# Patient Record
Sex: FEMALE | Race: WHITE | NOT HISPANIC OR LATINO | Employment: FULL TIME | URBAN - METROPOLITAN AREA
[De-identification: names, ages, dates, MRNs, and addresses within clinical notes are randomized per-mention and may not be internally consistent; named-entity substitution may affect disease eponyms.]

---
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
## 2020-02-06 ENCOUNTER — OFFICE VISIT (OUTPATIENT)
Dept: URGENT CARE | Facility: CLINIC | Age: 20
End: 2020-02-06
Payer: COMMERCIAL

## 2020-02-06 VITALS
WEIGHT: 170 LBS | RESPIRATION RATE: 16 BRPM | OXYGEN SATURATION: 95 % | TEMPERATURE: 99 F | BODY MASS INDEX: 29.18 KG/M2 | HEART RATE: 92 BPM | DIASTOLIC BLOOD PRESSURE: 74 MMHG | SYSTOLIC BLOOD PRESSURE: 118 MMHG

## 2020-02-06 DIAGNOSIS — M54.42 CHRONIC BILATERAL LOW BACK PAIN WITH BILATERAL SCIATICA: ICD-10-CM

## 2020-02-06 DIAGNOSIS — M54.41 CHRONIC BILATERAL LOW BACK PAIN WITH BILATERAL SCIATICA: ICD-10-CM

## 2020-02-06 DIAGNOSIS — G89.29 CHRONIC BILATERAL LOW BACK PAIN WITH BILATERAL SCIATICA: ICD-10-CM

## 2020-02-06 PROCEDURE — 99213 OFFICE O/P EST LOW 20 MIN: CPT | Performed by: NURSE PRACTITIONER

## 2020-02-06 ASSESSMENT — ENCOUNTER SYMPTOMS
BACK PAIN: 1
WEAKNESS: 0
FEVER: 0
CONSTITUTIONAL NEGATIVE: 1
NEUROLOGICAL NEGATIVE: 1
GASTROINTESTINAL NEGATIVE: 1
SENSORY CHANGE: 0

## 2020-02-06 NOTE — LETTER
February 6, 2020         Patient: Rosemary Gonsalez   YOB: 2000   Date of Visit: 2/6/2020           To Whom it May Concern:    Rosemary Gonsalez was seen in my clinic on 2/6/2020.  Patient may return to work with the following work restrictions: Lifting, pushing, pulling limited to 10 pounds.  Restrictions in effect until cleared by your sports medicine.    If you have any questions or concerns, please don't hesitate to call.        Sincerely,           JAKE Chand.  Electronically Signed

## 2020-02-07 NOTE — PROGRESS NOTES
Subjective:     Rosemary Gonsalez is a 20 y.o. female who presents for Low Back Pain (pt states she gets lower back pain that comes and goes and it been going on x 3 years)       Back Pain   This is a new problem. The problem has been gradually worsening since onset. Pertinent negatives include no dysuria, fever or weakness.     Patient reports chronic lower back pain x 9 years.  Reports she has seen multiple doctors and has been given different diagnoses, but there is still uncertainty on what causes her pain.  Was told that she has scoliosis.  Has not had an MRI or physical therapy in the past.  Denies previous injury or surgery.  Denies pain at this time.  Reports the pain comes and goes.  Reports that she has been working with weight limits with pushing, pulling, and lifting as these can aggravate the pain.  She just started a new job and needs a new note.  Has not seen specialty care recently.  Just got new insurance.  When patient does get pain, she reports that has a bilateral tingling type pain which would radiate down her thighs.  She would manage the pain with ibuprofen and heat.    PMH:  has a past medical history of Asthma, Bronchitis, and Pneumonia.    MEDS:   Current Outpatient Medications:   •  ibuprofen (MOTRIN) 600 MG Tab, Take 1 Tab by mouth every 6 hours as needed., Disp: 30 Tab, Rfl: 0  •  hydrOXYzine HCl (ATARAX) 25 MG Tab, Take 25 mg by mouth 3 times a day as needed for Itching., Disp: , Rfl:   •  traZODone (DESYREL) 100 MG Tab, Take 25 mg by mouth every evening., Disp: , Rfl:   •  Albuterol Sulfate (VENTOLIN HFA INH), Inhale  by mouth., Disp: , Rfl:   •  ibuprofen (MOTRIN) 600 MG Tab, Take 600 mg by mouth every 6 hours as needed., Disp: , Rfl:   •  Non Formulary Request, asthalin inhaler, Disp: , Rfl:     ALLERGIES:   Allergies   Allergen Reactions   • Other Misc Swelling     Mosquito bites   • Silver Swelling     SURGHX: History reviewed. No pertinent surgical history.    SOCHX:  reports  that she has never smoked. She has never used smokeless tobacco. She reports that she does not drink alcohol or use drugs.     FH: Reviewed with patient, not pertinent to this visit.    Review of Systems   Constitutional: Negative.  Negative for fever and malaise/fatigue.   Gastrointestinal: Negative.    Genitourinary: Negative.  Negative for dysuria.   Musculoskeletal: Positive for back pain.   Neurological: Negative.  Negative for sensory change and weakness.   All other systems reviewed and are negative.    Additional details per HPI.    Objective:     /74 (BP Location: Left arm, Patient Position: Sitting, BP Cuff Size: Adult)   Pulse 92   Temp 37.2 °C (99 °F) (Temporal)   Resp 16   Wt 77.1 kg (170 lb)   SpO2 95%   BMI 29.18 kg/m²     Physical Exam  Vitals signs reviewed.   Constitutional:       General: She is not in acute distress.     Appearance: She is well-developed. She is not toxic-appearing or diaphoretic.   HENT:      Head: Normocephalic.      Right Ear: External ear normal.      Left Ear: External ear normal.      Nose: Nose normal.   Eyes:      Extraocular Movements: Extraocular movements intact.      Conjunctiva/sclera: Conjunctivae normal.   Neck:      Musculoskeletal: Normal range of motion.   Cardiovascular:      Rate and Rhythm: Normal rate.   Pulmonary:      Effort: Pulmonary effort is normal. No respiratory distress.   Musculoskeletal: Normal range of motion.         General: No deformity.      Lumbar back: Normal. She exhibits normal range of motion, no tenderness, no swelling, no deformity, no laceration and no pain.   Skin:     General: Skin is warm and dry.      Coloration: Skin is not pale.   Neurological:      General: No focal deficit present.      Mental Status: She is alert and oriented to person, place, and time.      Sensory: Sensation is intact. No sensory deficit.      Motor: Motor function is intact.      Coordination: Coordination is intact. Coordination normal.       Gait: Gait normal.   Psychiatric:         Behavior: Behavior normal. Behavior is cooperative.          Assessment/Plan:     1. Chronic bilateral low back pain with bilateral sciatica  - REFERRAL TO SPORTS MEDICINE    Work note provided with restrictions.  Would like patient to follow-up with sports medicine for further evaluation and treatment of chronic, recurrent lower back pain.    Advised gentle massage, stretching, and range of motion exercises. May utilize cold/heat application. May use OTC acetaminophen/ibuprofen as needed for additional pain relief.    Vital signs stable, afebrile, asymptomatic, no acute distress.    Warning signs reviewed. Return precautions discussed.    Patient advised to: Return for 1) Symptoms don't improve or worsen, or go to ER, 2) Follow up with primary care in 7-10 days.    Differential diagnosis, natural history, supportive care, and indications for immediate follow-up discussed. All questions answered. Patient agrees with the plan of care.

## 2020-03-23 ENCOUNTER — OFFICE VISIT (OUTPATIENT)
Dept: MEDICAL GROUP | Facility: CLINIC | Age: 20
End: 2020-03-23
Payer: COMMERCIAL

## 2020-03-23 VITALS
BODY MASS INDEX: 29.02 KG/M2 | TEMPERATURE: 98.5 F | OXYGEN SATURATION: 99 % | HEIGHT: 64 IN | SYSTOLIC BLOOD PRESSURE: 122 MMHG | RESPIRATION RATE: 18 BRPM | HEART RATE: 88 BPM | WEIGHT: 170 LBS | DIASTOLIC BLOOD PRESSURE: 80 MMHG

## 2020-03-23 DIAGNOSIS — M53.3 SACROILIAC JOINT DYSFUNCTION OF BOTH SIDES: ICD-10-CM

## 2020-03-23 PROCEDURE — 99203 OFFICE O/P NEW LOW 30 MIN: CPT | Performed by: FAMILY MEDICINE

## 2020-03-23 NOTE — PROGRESS NOTES
CHIEF COMPLAINT:  Rosemary Gonsalez female presenting at the request of SHAYLA Chand  for evaluation of LOW BACK pain.     Rosemary Gonsalez is complaining of mid LUMBAR SPINE pain  present for 9 years  Pain is at the L5  Quality is intermittently sharp and shooting, dull at baseline which is constant  Pain is on right POSTERIOR thigh(s) past knee  Aggravated by Movement lying in same position too long. 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 No pertinent past medical history.    PMH:  has a past medical history of Asthma, Bronchitis, and Pneumonia.  MEDS:   Current Outpatient Medications:   •  ibuprofen (MOTRIN) 600 MG Tab, Take 1 Tab by mouth every 6 hours as needed., Disp: 30 Tab, Rfl: 0  •  hydrOXYzine HCl (ATARAX) 25 MG Tab, Take 25 mg by mouth 3 times a day as needed for Itching., Disp: , Rfl:   •  traZODone (DESYREL) 100 MG Tab, Take 25 mg by mouth every evening., Disp: , Rfl:   •  Albuterol Sulfate (VENTOLIN HFA INH), Inhale  by mouth., Disp: , Rfl:   •  ibuprofen (MOTRIN) 600 MG Tab, Take 600 mg by mouth every 6 hours as needed., Disp: , Rfl:   •  Non Formulary Request, asthalin inhaler, Disp: , Rfl:   ALLERGIES:   Allergies   Allergen Reactions   • Other  "Misc Swelling     Mosquito bites   • Silver Swelling     SURGHX: No past surgical history on file.  SOCHX:  reports that she has never smoked. She has never used smokeless tobacco. She reports that she does not drink alcohol or use drugs.  FH: Family history was reviewed, no pertinent findings to report     PHYSICAL EXAM:  /80 (BP Location: Left arm, Patient Position: Sitting, BP Cuff Size: Adult)   Pulse 88   Temp 36.9 °C (98.5 °F) (Temporal)   Resp 18   Ht 1.626 m (5' 4\")   Wt 77.1 kg (170 lb)   SpO2 99%   BMI 29.18 kg/m²      slightly overweight   mild distress  alert and oriented x 3.  Gait: normal    Lumbar Spine:    Able to walk on heels and toes, Rotates Bilaterally without difficulty, Normal alignement, NO tenderness of the lumbar spine, Difficulty flexing due to pain and Difficulty extending due to pain    Hip Flexion 5/5  Knee Flexion 5/5  Knee Extension 5/5  Foot Dorsiflexion 5/5  EHL testing 5/5      Sensation:  Decreased bilaterally in a nonspecific dermatomal pattern          Reflexes:   Patellar: R 2+/L  2+  Achilles: R 2+/L  2+  Babinski toes down  Upper motor neuron testing is Negative  The legs are otherwise neurovascularly intact     Slump testing is NEGATIVE    Additional Findings: Tight hamstrings     HIP EXAM:    LEFT hip: Range of motion is intact  NEGATIVE pain with internal rotation  denny's test is POSITIVE with POSITIVE tenderness at the sacroiliac joint  NO tenderness of the trochanteric bursa  NO tenderness of the gluteus medius  Freddie's test is NEGATIVE    RIGHT hip: Range of motion is intact  NEGATIVE pain with internal rotation  denny's test is NEGATIVE  NO tenderness of the trochanteric bursa  NO tenderness of the gluteus medius  Freddie's test is NEGATIVE      1. Sacroiliac joint dysfunction of both sides  REFERRAL TO PHYSICAL THERAPY Reason for Therapy: Eval/Treat/Report     Patient responded POSITIVELY to sacroiliac belt    Recommended formal physical therapy    Return in " about 4 weeks (around 4/20/2020).  To see how she is doing with Pilates/home program for sacroiliac joint exercises and see if she started or scheduled to start formal physical therapy.    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  Back pain, fall, injury     TECHNIQUE/ EXAM DESCRIPTION AND NUMBER OF VIEWS:  3 views of the lumbar spine.     COMPARISON: Lumbar spine x-ray 1/24/2019     FINDINGS:  The lumbar vertebral bodies are normal in height.     Alignment is normal.     There is mild levoconvex curvature.     Disk spaces are maintained.     There is no facet arthropathy.     The visualized portions of the abdomen are within normal limits.     IMPRESSION:     Negative for lumbar spine fracture or malalignment          8/29/2019 1:04 AM     HISTORY/REASON FOR EXAM:  Pain Following Trauma     Back pain. Injury     TECHNIQUE/EXAM DESCRIPTION AND NUMBER OF VIEWS:  Thoracic spine series with swimmer's view.     COMPARISON:  Lumbar spine x-ray 8/29/2019     FINDINGS:  The thoracic vertebral bodies are normal in height and alignment.     There are no fractures.     There are no degenerative changes.     The visualized portions of the lungs are clear.     IMPRESSION:        Negative thoracic spine series        done elsewhere and reviewed independently by me    Thank you Adam Kline, MARIA T.P.R.N. for allowing me to participate in caring for your patient.        Disability forms completed and will be scanned.

## 2020-06-30 ENCOUNTER — TELEPHONE (OUTPATIENT)
Dept: MEDICAL GROUP | Facility: MEDICAL CENTER | Age: 20
End: 2020-06-30

## 2020-07-23 ENCOUNTER — TELEPHONE (OUTPATIENT)
Dept: MEDICAL GROUP | Facility: MEDICAL CENTER | Age: 20
End: 2020-07-23

## 2020-10-09 NOTE — ED NOTES
Patient to xray via rney   The patient has been examined and the H&P has been reviewed:    I concur with the findings and no changes have occurred since H&P was written.    Surgery risks, benefits and alternative options discussed and understood by patient/family.          Active Hospital Problems    Diagnosis  POA    Cubital tunnel syndrome [G56.20]  Yes      Resolved Hospital Problems   No resolved problems to display.

## 2021-05-22 ENCOUNTER — OFFICE VISIT (OUTPATIENT)
Dept: URGENT CARE | Facility: CLINIC | Age: 21
End: 2021-05-22
Payer: COMMERCIAL

## 2021-05-22 VITALS
RESPIRATION RATE: 16 BRPM | BODY MASS INDEX: 30.73 KG/M2 | WEIGHT: 180 LBS | HEART RATE: 80 BPM | HEIGHT: 64 IN | DIASTOLIC BLOOD PRESSURE: 70 MMHG | TEMPERATURE: 98.8 F | OXYGEN SATURATION: 97 % | SYSTOLIC BLOOD PRESSURE: 110 MMHG

## 2021-05-22 DIAGNOSIS — Z87.09 HISTORY OF STREP PHARYNGITIS: ICD-10-CM

## 2021-05-22 DIAGNOSIS — J02.9 SORE THROAT: ICD-10-CM

## 2021-05-22 DIAGNOSIS — H66.002 ACUTE SUPPURATIVE OTITIS MEDIA OF LEFT EAR WITHOUT SPONTANEOUS RUPTURE OF TYMPANIC MEMBRANE, RECURRENCE NOT SPECIFIED: ICD-10-CM

## 2021-05-22 DIAGNOSIS — J03.90 ACUTE TONSILLITIS, UNSPECIFIED ETIOLOGY: ICD-10-CM

## 2021-05-22 LAB
INT CON NEG: NEGATIVE
INT CON POS: POSITIVE
S PYO AG THROAT QL: NEGATIVE

## 2021-05-22 PROCEDURE — 87880 STREP A ASSAY W/OPTIC: CPT | Performed by: NURSE PRACTITIONER

## 2021-05-22 PROCEDURE — 99214 OFFICE O/P EST MOD 30 MIN: CPT | Performed by: NURSE PRACTITIONER

## 2021-05-22 RX ORDER — AZITHROMYCIN 250 MG/1
TABLET, FILM COATED ORAL
Qty: 6 TABLET | Refills: 0 | Status: SHIPPED | OUTPATIENT
Start: 2021-05-22 | End: 2021-10-26

## 2021-05-22 RX ORDER — AZITHROMYCIN 500 MG/1
TABLET, FILM COATED ORAL
COMMUNITY
Start: 2021-05-12 | End: 2021-05-22

## 2021-05-22 RX ORDER — MELOXICAM 15 MG/1
15 TABLET ORAL DAILY
COMMUNITY
End: 2021-10-26

## 2021-05-22 RX ORDER — METRONIDAZOLE 500 MG/1
TABLET ORAL
COMMUNITY
Start: 2021-05-12 | End: 2021-05-22

## 2021-05-22 ASSESSMENT — ENCOUNTER SYMPTOMS
EYE DISCHARGE: 0
NAUSEA: 0
COUGH: 0
EYE REDNESS: 0
FEVER: 0
MYALGIAS: 0
SORE THROAT: 1
SINUS PAIN: 0
SHORTNESS OF BREATH: 0

## 2021-05-22 ASSESSMENT — LIFESTYLE VARIABLES: SUBSTANCE_ABUSE: 0

## 2021-05-22 NOTE — PROGRESS NOTES
Rosemary Gonsalez is a 21 y.o. female who presents for Sore Throat (ear pain, white patches in the back of the throat, x2days)      HPI this new problem.  Rosemary is a 21-year-old female presents for sore throat, left ear pain, white patches on her tonsils for 2 days.  Last night she had a subjective fever.  She has a history of frequent strep throat infections.  She has pain with swallowing.  Pain is most significant in her left ear.  Pain radiates from her ear down into her neck.  Last night was hard to sleep because the throbbing pain in her left ear was so intense.  She has taken Tylenol, ibuprofen, throat sprays.  She has had only minimal relief from these interventions.  She has tried to stay well-hydrated.  No other aggravating or alleviating factors.    Review of Systems   Constitutional: Negative for fever and malaise/fatigue.   HENT: Positive for ear pain and sore throat. Negative for congestion and sinus pain.    Eyes: Negative for discharge and redness.   Respiratory: Negative for cough and shortness of breath.    Gastrointestinal: Negative for nausea.   Musculoskeletal: Negative for myalgias.   Endo/Heme/Allergies: Negative for environmental allergies.   Psychiatric/Behavioral: Negative for substance abuse.       Allergies   Allergen Reactions   • Garlic    • Other Misc Swelling     Mosquito bites   • Silver Swelling     Past Medical History:   Diagnosis Date   • Asthma    • Bronchitis    • Pneumonia      No past surgical history on file.  No family history on file.  Social History     Tobacco Use   • Smoking status: Never Smoker   • Smokeless tobacco: Never Used   Substance Use Topics   • Alcohol use: No     There is no problem list on file for this patient.    Current Outpatient Medications on File Prior to Visit   Medication Sig Dispense Refill   • meloxicam (MOBIC) 15 MG tablet Take 15 mg by mouth every day.     • hydrOXYzine HCl (ATARAX) 25 MG Tab Take 25 mg by mouth 3 times a day as needed for  "Itching.     • Albuterol Sulfate (VENTOLIN HFA INH) Inhale  by mouth.     • Non Formulary Request asthalin inhaler       No current facility-administered medications on file prior to visit.          Objective:     /70 (BP Location: Right arm, Patient Position: Sitting, BP Cuff Size: Adult long)   Pulse 80   Temp 37.1 °C (98.8 °F) (Temporal)   Resp 16   Ht 1.626 m (5' 4\")   Wt 81.6 kg (180 lb)   SpO2 97%   BMI 30.90 kg/m²     Physical Exam  Vitals and nursing note reviewed.   Constitutional:       General: She is not in acute distress.     Appearance: She is well-developed. She is not toxic-appearing.   HENT:      Head: Normocephalic.      Right Ear: Hearing, tympanic membrane, ear canal and external ear normal.      Left Ear: Hearing, ear canal and external ear normal. A middle ear effusion is present. Tympanic membrane is erythematous and bulging.      Nose: Nose normal.      Right Sinus: No frontal sinus tenderness.      Left Sinus: No frontal sinus tenderness.      Mouth/Throat:      Lips: Pink.      Mouth: Mucous membranes are moist.      Pharynx: Uvula midline. Posterior oropharyngeal erythema present. No oropharyngeal exudate or uvula swelling.      Tonsils: Tonsillar exudate present. No tonsillar abscesses. 2+ on the right. 2+ on the left.      Comments: Hypertrophic cavernous, erythematous tonsils with white patchy exudate L > R  Eyes:      General: Lids are normal.      Pupils: Pupils are equal, round, and reactive to light.   Neck:      Trachea: Trachea and phonation normal.   Cardiovascular:      Rate and Rhythm: Normal rate and regular rhythm.      Pulses: Normal pulses.   Pulmonary:      Effort: Pulmonary effort is normal. No respiratory distress.      Breath sounds: Normal breath sounds.   Abdominal:      Palpations: Abdomen is soft.   Musculoskeletal:         General: Normal range of motion.      Cervical back: Full passive range of motion without pain, normal range of motion and neck " supple.   Lymphadenopathy:      Head:      Right side of head: No tonsillar adenopathy.      Left side of head: No tonsillar adenopathy.      Cervical: Cervical adenopathy present.      Left cervical: Superficial cervical adenopathy present.      Upper Body:      Right upper body: No supraclavicular adenopathy.      Left upper body: No supraclavicular adenopathy.   Skin:     General: Skin is warm and dry.   Neurological:      Mental Status: She is alert and oriented to person, place, and time.      Deep Tendon Reflexes: Reflexes are normal and symmetric.   Psychiatric:         Speech: Speech normal.         Behavior: Behavior normal.       Results for orders placed or performed in visit on 05/22/21   POCT Rapid Strep A   Result Value Ref Range    Rapid Strep Screen Negative     Internal Control Positive Positive     Internal Control Negative Negative          Assessment /Associated Orders:      1. Sore throat  POCT Rapid Strep A   2. Acute suppurative otitis media of left ear without spontaneous rupture of tympanic membrane, recurrence not specified  azithromycin (ZITHROMAX) 250 MG Tab   3. Acute tonsillitis, unspecified etiology  azithromycin (ZITHROMAX) 250 MG Tab   4. History of strep pharyngitis         Medical Decision Making:    Pt is clinically stable at today's acute urgent care visit.  No acute distress noted. Appropriate for outpatient management at this time.   Acute problem today with uncertain prognosis.     OTC  analgesic of choice (acetaminophen or NSAID). Follow manufactures dosing and safety precautions.   Salt water gargles BID and prn. Suggested 1/4 to 1/2 teaspoon (1.5 to 3.0 g) of salt per one cup (8 ounces or 250 mL) of warm water.   OTC throat analgesic spray or lozenge of choice prn throat pain. Dosage and directions per   Educated in proper administration of medication(s) ordered today including safety, possible SE, risks, benefits, rationale and alternatives to therapy.    Consider probiotics   Discussed possible referral to ENT for frequent strep throat and tonsillitis infections. Declines referral at this time.     Advised to follow-up with the primary care provider for recheck, reevaluation, and consideration of further management if necessary.   Discussed management options (risks,benefits, and alternatives to treatment). Expressed understanding and the treatment plan was agreed upon. Questions were encouraged and answered       Return to urgent care prn if new or worsening sx or if there is no improvement in condition prn . Red flags discussed and indications to immediately call 911 or present to the Emergency Department.     I personally reviewed prior external notes and test results pertinent to today's visit.  I have independently reviewed and interpreted all diagnostics ordered during this urgent care acute visit.   Time spent evaluating this patient was at least 30 minutes and includes preparing for visit, counseling/education, exam and evaluation, obtaining history, independent interpretation, ordering lab/test/procedures,medication management and documentation.

## 2021-05-22 NOTE — LETTER
May 22, 2021       Patient: Rosemary Gonsalez   YOB: 2000   Date of Visit: 5/22/2021         To Whom It May Concern:    In my medical opinion, I recommend that Rosemary Gonsalez return to full duty, no restrictions on 05/23/21              Sincerely,          Doretha Jacob, A.P.N.  Electronically Signed

## 2021-08-05 ENCOUNTER — HOSPITAL ENCOUNTER (OUTPATIENT)
Facility: MEDICAL CENTER | Age: 21
End: 2021-08-05
Attending: NURSE PRACTITIONER
Payer: COMMERCIAL

## 2021-08-05 ENCOUNTER — OFFICE VISIT (OUTPATIENT)
Dept: URGENT CARE | Facility: CLINIC | Age: 21
End: 2021-08-05
Payer: COMMERCIAL

## 2021-08-05 VITALS
OXYGEN SATURATION: 100 % | SYSTOLIC BLOOD PRESSURE: 100 MMHG | WEIGHT: 184.2 LBS | DIASTOLIC BLOOD PRESSURE: 68 MMHG | HEIGHT: 64 IN | RESPIRATION RATE: 16 BRPM | BODY MASS INDEX: 31.45 KG/M2 | TEMPERATURE: 98.3 F | HEART RATE: 107 BPM

## 2021-08-05 DIAGNOSIS — J06.9 VIRAL URI WITH COUGH: ICD-10-CM

## 2021-08-05 DIAGNOSIS — R50.9 FEVER IN ADULT: ICD-10-CM

## 2021-08-05 LAB
INT CON NEG: NORMAL
INT CON POS: NORMAL
S PYO AG THROAT QL: POSITIVE

## 2021-08-05 PROCEDURE — 87880 STREP A ASSAY W/OPTIC: CPT | Performed by: NURSE PRACTITIONER

## 2021-08-05 PROCEDURE — U0003 INFECTIOUS AGENT DETECTION BY NUCLEIC ACID (DNA OR RNA); SEVERE ACUTE RESPIRATORY SYNDROME CORONAVIRUS 2 (SARS-COV-2) (CORONAVIRUS DISEASE [COVID-19]), AMPLIFIED PROBE TECHNIQUE, MAKING USE OF HIGH THROUGHPUT TECHNOLOGIES AS DESCRIBED BY CMS-2020-01-R: HCPCS

## 2021-08-05 PROCEDURE — 99213 OFFICE O/P EST LOW 20 MIN: CPT | Performed by: NURSE PRACTITIONER

## 2021-08-05 PROCEDURE — U0005 INFEC AGEN DETEC AMPLI PROBE: HCPCS

## 2021-08-05 ASSESSMENT — ENCOUNTER SYMPTOMS
FEVER: 1
WHEEZING: 1
DIARRHEA: 0
SHORTNESS OF BREATH: 1
ABDOMINAL PAIN: 0
HEADACHES: 1
VOMITING: 0
SORE THROAT: 1
COUGH: 1

## 2021-08-05 NOTE — LETTER
August 5, 2021         Patient: Rosemary Gonsalez   YOB: 2000   Date of Visit: 8/5/2021     To Whom it May Concern:    Rosemary Gonsalez was seen in my clinic on 8/5/2021.     A concern for COVID-19 has been identified and testing is in progress. The results are available through our electronic delivery system called [employer].      We are asking employers to excuse absences while they follow self-isolation protocol per CDC guidelines:   • At least 10 days since symptoms first appeared and   • At least 24 hours with no fever greater than 100.4 F without fever-reducing medication and   • Symptoms have improved    If results are negative, you can end isolation if symptoms have improved and you have not had a fever in the last 24 hours. You should continue to self isolate per CDC guidelines if you have had direct close contact with someone who's tested positive for COVID-19 (someone you live with is positive, or you were within 6 feet of someone who has COVID-19 for a total of 15 minutes or more). Repeat testing is not offered through our urgent care.     If the results of testing are positive then you will be contacted by your [facility] department for further instructions on duration of self-isolation and return to work. In general, this will also follow the CDC guidelines with a minimum of 10 days from the onset of symptoms and symptoms are improving without fever.      Please schedule a visit with a primary care provider if documents for FMLA, disability, or unemployment are required.      If you have any questions or concerns, please don't hesitate to call.      Sincerely,       JAKE Morales.  Electronically Signed

## 2021-08-06 DIAGNOSIS — R50.9 FEVER IN ADULT: ICD-10-CM

## 2021-08-06 LAB
COVID ORDER STATUS COVID19: NORMAL
SARS-COV-2 RNA RESP QL NAA+PROBE: NOTDETECTED
SPECIMEN SOURCE: NORMAL

## 2021-08-06 NOTE — PROGRESS NOTES
Subjective:     Rosemary Gonsalez is a 21 y.o. female who presents for Fever (congestion, headache x 2 days )      Started on Tuesday. Frequent hx of tonsillitis. Right ear pain, lymph nodes were swollen. Sore throat and ear pain now resolved. SOB,  With hx of asthma. No N/V/D. No hx of COVID. 1st covid vaccine 2 weeks ago, had a headache at the time. No prolonged symptoms.             Fever   This is a new problem. The current episode started in the past 7 days. The problem has been gradually worsening. Associated symptoms include congestion, coughing, ear pain, headaches, a sore throat and wheezing. Pertinent negatives include no abdominal pain, diarrhea or vomiting.   Risk factors: no sick contacts        Past Medical History:   Diagnosis Date   • Asthma    • Bronchitis    • Pneumonia        History reviewed. No pertinent surgical history.    Social History     Socioeconomic History   • Marital status: Single     Spouse name: Not on file   • Number of children: Not on file   • Years of education: Not on file   • Highest education level: Not on file   Occupational History   • Not on file   Tobacco Use   • Smoking status: Never Smoker   • Smokeless tobacco: Never Used   Vaping Use   • Vaping Use: Never used   Substance and Sexual Activity   • Alcohol use: No   • Drug use: No   • Sexual activity: Not on file   Other Topics Concern   • Behavioral problems Not Asked   • Interpersonal relationships Not Asked   • Sad or not enjoying activities Not Asked   • Suicidal thoughts Not Asked   • Poor school performance Not Asked   • Reading difficulties Not Asked   • Speech difficulties Not Asked   • Writing difficulties Not Asked   • Inadequate sleep Not Asked   • Excessive TV viewing Not Asked   • Excessive video game use Not Asked   • Inadequate exercise Not Asked   • Sports related Not Asked   • Poor diet Not Asked   • Family concerns for drug/alcohol abuse Not Asked   • Poor oral hygiene Not Asked   • Bike safety Not  "Asked   • Family concerns vehicle safety Not Asked   Social History Narrative   • Not on file     Social Determinants of Health     Financial Resource Strain:    • Difficulty of Paying Living Expenses:    Food Insecurity:    • Worried About Running Out of Food in the Last Year:    • Ran Out of Food in the Last Year:    Transportation Needs:    • Lack of Transportation (Medical):    • Lack of Transportation (Non-Medical):    Physical Activity:    • Days of Exercise per Week:    • Minutes of Exercise per Session:    Stress:    • Feeling of Stress :    Social Connections:    • Frequency of Communication with Friends and Family:    • Frequency of Social Gatherings with Friends and Family:    • Attends [employer]ast[employer] Services:    • Active Member of Clubs or Organizations:    • Attends Club or Organization Meetings:    • Marital Status:    Intimate Partner Violence:    • Fear of Current or Ex-Partner:    • Emotionally Abused:    • Physically Abused:    • Sexually Abused:         History reviewed. No pertinent family history.     Allergies   Allergen Reactions   • Garlic Cough, Hives, Itching, Shortness of Breath and Swelling   • Other Misc Swelling     Mosquito bites   • Silver Swelling       Review of Systems   Constitutional: Positive for fever and malaise/fatigue.   HENT: Positive for congestion, ear pain and sore throat. Negative for ear discharge.    Respiratory: Positive for cough, shortness of breath and wheezing.    Gastrointestinal: Negative for abdominal pain, diarrhea and vomiting.   Neurological: Positive for headaches.   All other systems reviewed and are negative.       Objective:   /68 (BP Location: Left arm, Patient Position: Sitting, BP Cuff Size: Adult)   Pulse (!) 107   Temp 36.8 °C (98.3 °F) (Temporal)   Resp 16   Ht 1.626 m (5' 4\")   Wt 83.6 kg (184 lb 3.2 oz)   SpO2 100%   BMI 31.62 kg/m²     Physical Exam  Vitals reviewed.   Constitutional:       General: She is not in acute distress.     " Appearance: She is well-developed. She is not toxic-appearing.   HENT:      Head: Normocephalic and atraumatic.      Right Ear: Ear canal and external ear normal. No drainage or tenderness. A middle ear effusion is present. There is no impacted cerumen. No mastoid tenderness. No hemotympanum. Tympanic membrane is not injected, perforated or erythematous.      Left Ear: Tympanic membrane, ear canal and external ear normal. There is no impacted cerumen.      Nose: Mucosal edema present.      Mouth/Throat:      Lips: Pink.      Mouth: Mucous membranes are moist.      Pharynx: Uvula midline. Posterior oropharyngeal erythema present.      Tonsils: No tonsillar exudate or tonsillar abscesses. 2+ on the right. 2+ on the left.      Comments: Mild oropharyngeal erythema.   Eyes:      Conjunctiva/sclera: Conjunctivae normal.   Cardiovascular:      Rate and Rhythm: Normal rate.   Pulmonary:      Effort: Pulmonary effort is normal. No bradypnea, accessory muscle usage, prolonged expiration, respiratory distress or retractions.      Breath sounds: Normal breath sounds.   Musculoskeletal:         General: Normal range of motion.      Cervical back: Normal range of motion and neck supple.   Skin:     General: Skin is warm and dry.      Findings: No rash.   Neurological:      General: No focal deficit present.      Mental Status: She is alert and oriented to person, place, and time.      GCS: GCS eye subscore is 4. GCS verbal subscore is 5. GCS motor subscore is 6.   Psychiatric:         Mood and Affect: Mood normal.         Speech: Speech normal.         Behavior: Behavior normal.         Thought Content: Thought content normal.         Judgment: Judgment normal.         Assessment/Plan:   1. Viral URI with cough  - COVID/SARS CoV-2; Future    2. Fever in adult  - POCT Rapid Strep A  - COVID/SARS CoV-2; Future    Results for orders placed or performed during the hospital encounter of 08/05/21   COVID/SARS CoV-2    Specimen:  Nasopharyngeal; Respirate   Result Value Ref Range    COVID Order Status Received    SARS-CoV-2, PCR (In-House)   Result Value Ref Range    SARS-CoV-2 Source Nasal Swab     SARS-CoV-2 by PCR NotDetected    Symptomatic care.  -Oral hydration and rest.   -Cough control: nonpharmacologic options for cough relief such as throat lozenges, hot tea, honey.  -Over the counter expectorant as directed; Guaifenesin (Mucinex).  -Tylenol or ibuprofen for pain and fever as directed.   -Albuterol as directed.  -OTC antihistamine for ear.     Seek emergency medical care immediately for: Trouble breathing, persistent pain or pressure in the chest, confusion, inability to wake or stay awake, bluish lips or face, persistent tachycardia (fast heart rate), prolonged dizziness, persistent high grade fevers. Follow up for prolonged cough, persistent wheezing, leg swelling, increased or persistent ear pain, or any other concerns. Follow up with PCP.     Discussed viral etiology. COVID S&S, and self isolation guidelines. S&S of PNA with follow up. Discussed testing for strep as initial symptoms started with pharyngitis and lymphadenopathy.     Differential diagnosis, natural history, supportive care, and indications for immediate follow-up discussed.

## 2021-08-06 NOTE — PATIENT INSTRUCTIONS
Symptomatic care.  -Oral hydration and rest.   -Cough control: nonpharmacologic options for cough relief such as throat lozenges, hot tea, honey.  -Over the counter expectorant as directed; Guaifenesin (Mucinex).  -Tylenol or ibuprofen for pain and fever as directed.   -OTC antihistamine for ear.     Seek emergency medical care immediately for: Trouble breathing, persistent pain or pressure in the chest, confusion, inability to wake or stay awake, bluish lips or face, persistent tachycardia (fast heart rate), prolonged dizziness, persistent high grade fevers. Follow up for prolonged cough, persistent wheezing, leg swelling, increased or persistent ear pain, or any other concerns. Follow up with PCP.         Viral Respiratory Infection  A respiratory infection is an illness that affects part of the respiratory system, such as the lungs, nose, or throat. A respiratory infection that is caused by a virus is called a viral respiratory infection.  Common types of viral respiratory infections include:  · A cold.  · The flu (influenza).  · A respiratory syncytial virus (RSV) infection.  What are the causes?  This condition is caused by a virus.  What are the signs or symptoms?  Symptoms of this condition include:  · A stuffy or runny nose.  · Yellow or green nasal discharge.  · A cough.  · Sneezing.  · Fatigue.  · Achy muscles.  · A sore throat.  · Sweating or chills.  · A fever.  · A headache.  How is this diagnosed?  This condition may be diagnosed based on:  · Your symptoms.  · A physical exam.  · Testing of nasal swabs.  How is this treated?  This condition may be treated with medicines, such as:  · Antiviral medicine. This may shorten the length of time a person has symptoms.  · Expectorants. These make it easier to cough up mucus.  · Decongestant nasal sprays.  · Acetaminophen or NSAIDs to relieve fever and pain.  Antibiotic medicines are not prescribed for viral infections. This is because antibiotics are designed  to kill bacteria. They are not effective against viruses.  Follow these instructions at home:    Managing pain and congestion  · Take over-the-counter and prescription medicines only as told by your health care provider.  · If you have a sore throat, gargle with a salt-water mixture 3-4 times a day or as needed. To make a salt-water mixture, completely dissolve ½-1 tsp of salt in 1 cup of warm water.  · Use nose drops made from salt water to ease congestion and soften raw skin around your nose.  · Drink enough fluid to keep your urine pale yellow. This helps prevent dehydration and helps loosen up mucus.  General instructions  · Rest as much as possible.  · Do not drink alcohol.  · Do not use any products that contain nicotine or tobacco, such as cigarettes and e-cigarettes. If you need help quitting, ask your health care provider.  · Keep all follow-up visits as told by your health care provider. This is important.  How is this prevented?    · Get an annual flu shot. You may get the flu shot in late summer, fall, or winter. Ask your health care provider when you should get your flu shot.  · Avoid exposing others to your respiratory infection.  ? Stay home from work or school as told by your health care provider.  ? Wash your hands with soap and water often, especially after you cough or sneeze. If soap and water are not available, use alcohol-based hand .  · Avoid contact with people who are sick during cold and flu season. This is generally fall and winter.  Contact a health care provider if:  · Your symptoms last for 10 days or longer.  · Your symptoms get worse over time.  · You have a fever.  · You have severe sinus pain in your face or forehead.  · The glands in your jaw or neck become very swollen.  Get help right away if you:  · Feel pain or pressure in your chest.  · Have shortness of breath.  · Faint or feel like you will faint.  · Have severe and persistent vomiting.  · Feel confused or  disoriented.  Summary  · A respiratory infection is an illness that affects part of the respiratory system, such as the lungs, nose, or throat. A respiratory infection that is caused by a virus is called a viral respiratory infection.  · Common types of viral respiratory infections are a cold, influenza, and respiratory syncytial virus (RSV) infection.  · Symptoms of this condition include a stuffy or runny nose, cough, sneezing, fatigue, achy muscles, sore throat, and fevers or chills.  · Antibiotic medicines are not prescribed for viral infections. This is because antibiotics are designed to kill bacteria. They are not effective against viruses.  This information is not intended to replace advice given to you by your health care provider. Make sure you discuss any questions you have with your health care provider.  Document Released: 09/27/2006 Document Revised: 12/26/2019 Document Reviewed: 01/28/2019  [employer] Patient Education © 2020 [employer]      INSTRUCTIONS FOR COVID-19 OR ANY OTHER INFECTIOUS RESPIRATORY ILLNESSES    The Centers for Disease Control and Prevention (CDC) states that early indications for COVID-19 include cough, shortness of breath, difficulty breathing, or at least two of the following symptoms: chills, shaking with chills, muscle pain, headache, sore throat, and loss of taste or smell. Symptoms can range from mild to severe and may appear up to two weeks after exposure to the virus.    The practice of self-isolation and quarantine helps protect the public and your family by  preventing exposure to people who have or may have a contagious disease. Please follow the prevention steps below as based on CDC guidelines:    WHEN TO STOP ISOLATION: Persons with COVID-19 or any other infectious respiratory illness who have symptoms and were advised to care for themselves at home may discontinue home isolation under the following conditions:  · At least 24 hours have passed since recovery defined  as resolution of fever without the use of fever-reducing medications; AND,  · Improvement in respiratory symptoms (e.g., cough, shortness of breath); AND,  · At least 10 days have passed since symptoms first appeared and have had no subsequent illness.    MONITOR YOUR SYMPTOMS: If your illness is worsening, seek prompt medical attention. If you have a medical emergency and need to call 911, notify the dispatch personnel that you have, or are being evaluated for confirmed or suspected COVID-19 or another infectious respiratory illness. Wear a facemask if possible.    ACTIVITY RESTRICTION: restrict activities outside your home, except for getting medical care. Do not go to work, school, or public areas. Avoid using public transportation, ride-sharing, or taxis.    SCHEDULED MEDICAL APPOINTMENTS: Notify your provider that you have, or are being evaluated for, confirmed or suspected COVID-19 or another infectious respiratory. This will help the healthcare provider’s office safely take care of you and keep other people from getting exposed or infected.    FACEMASKS, when to wear: Anytime you are away from your home or around other people or pets. If you are unable to wear one, maintain a minimum of 6 feet distancing from others.    LIVING ENVIRONMENT: Stay in a separate room from other people and pets. If possible, use a separate bathroom, have someone else care for your pets and avoid sharing household items. Any items used should be washed thoroughly with soap and water. Clean all “high-touch” surfaces every day. Use a household cleaning spray or wipe, according to the label instructions. High touch surfaces include (but are not limited to) counters, tabletops, doorknobs, bathroom fixtures, toilets, phones, keyboards, tablets, and bedside tables.     HAND WASHING: Frequently wash hands with soap and water for at least 20 seconds,  especially after blowing your nose, coughing, or sneezing; going to the bathroom; before  and after interacting with pets; and before and after eating or preparing food. If hands are visibly dirty use soap and water. If soap and water are not available, use an alcohol-based hand  with at least 60% alcohol. Avoid touching your eyes, nose, and mouth with unwashed hands. Cover your coughs and sneezes with a tissue. Throw used tissues in a lined trash can. Immediately wash your hands.    ACTIVE/FACILITATED SELF-MONITORING: Follow instructions provided by your local health department or health professionals, as appropriate. When working with your local health department check their available hours.    [facility]   Phone Number   [place] [phone]   [facility] [address] [place] [phone]   [place] [address]   [place] [phone]     IF YOU HAVE CONFIRMED POSITIVE COVID-19:    Those who have completely recovered from COVID-19 may have immune-boosting antibodies in their plasma--called “convalescent plasma”--that could be used to treat critically ill COVID19 patients.    [facility] is excited to begin working with [facility] on collecting convalescent plasma from  people who have recovered from COVID-19 as part of a program to treat patients infected with the virus. This FDA-approved “emergency investigational new drug” is a special blood product containing antibodies that may give patients an extra boost to fight the virus.    To be eligible to donate convalescent plasma, you must have a prior COVID-19 diagnosis documented by a laboratory test (or a positive test result for SARS-CoV-2 antibodies) and meet additional eligibility requirements.    If you are interested in donating convalescent plasma or have any additional questions, please contact the [facility] Convalescent Plasma  at [phone] or via e-mail at [email or web address].    COVID-19  COVID-19 is a respiratory infection that is caused by a virus called severe acute respiratory syndrome coronavirus 2  (SARS-CoV-2). The disease is also known as coronavirus disease or novel coronavirus. In some people, the virus may not cause any symptoms. In others, it may cause a serious infection. The infection can get worse quickly and can lead to complications, such as:  · Pneumonia, or infection of the lungs.  · Acute respiratory distress syndrome or ARDS. This is fluid build-up in the lungs.  · Acute respiratory failure. This is a condition in which there is not enough oxygen passing from the lungs to the body.  · Sepsis or septic shock. This is a serious bodily reaction to an infection.  · Blood clotting problems.  · Secondary infections due to bacteria or fungus.  The virus that causes COVID-19 is contagious. This means that it can spread from person to person through droplets from coughs and sneezes (respiratory secretions).  What are the causes?  This illness is caused by a virus. You may catch the virus by:  · Breathing in droplets from an infected person's cough or sneeze.  · Touching something, like a table or a doorknob, that was exposed to the virus (contaminated) and then touching your mouth, nose, or eyes.  What increases the risk?  Risk for infection  You are more likely to be infected with this virus if you:  · Live in or travel to an area with a COVID-19 outbreak.  · Come in contact with a sick person who recently traveled to an area with a COVID-19 outbreak.  · Provide care for or live with a person who is infected with COVID-19.  Risk for serious illness  You are more likely to become seriously ill from the virus if you:  · Are 65 years of age or older.  · Have a long-term disease that lowers your body's ability to fight infection (immunocompromised).  · Live in a nursing home or long-term care facility.  · Have a long-term (chronic) disease such as:  ? Chronic lung disease, including chronic obstructive pulmonary disease or asthma  ? Heart disease.  ? Diabetes.  ? Chronic kidney disease.  ? Liver  disease.  · Are obese.  What are the signs or symptoms?  Symptoms of this condition can range from mild to severe. Symptoms may appear any time from 2 to 14 days after being exposed to the virus. They include:  · A fever.  · A cough.  · Difficulty breathing.  · Chills.  · Muscle pains.  · A sore throat.  · Loss of taste or smell.  Some people may also have stomach problems, such as nausea, vomiting, or diarrhea.  Other people may not have any symptoms of COVID-19.  How is this diagnosed?  This condition may be diagnosed based on:  · Your signs and symptoms, especially if:  ? You live in an area with a COVID-19 outbreak.  ? You recently traveled to or from an area where the virus is common.  ? You provide care for or live with a person who was diagnosed with COVID-19.  · A physical exam.  · Lab tests, which may include:  ? A nasal swab to take a sample of fluid from your nose.  ? A throat swab to take a sample of fluid from your throat.  ? A sample of mucus from your lungs (sputum).  ? Blood tests.  · Imaging tests, which may include, X-rays, CT scan, or ultrasound.  How is this treated?  At present, there is no medicine to treat COVID-19. Medicines that treat other diseases are being used on a trial basis to see if they are effective against COVID-19.  Your health care provider will talk with you about ways to treat your symptoms. For most people, the infection is mild and can be managed at home with rest, fluids, and over-the-counter medicines.  Treatment for a serious infection usually takes places in a hospital intensive care unit (ICU). It may include one or more of the following treatments. These treatments are given until your symptoms improve.  · Receiving fluids and medicines through an IV.  · Supplemental oxygen. Extra oxygen is given through a tube in the nose, a face mask, or a carter.  · Positioning you to lie on your stomach (prone position). This makes it easier for oxygen to get into the  lungs.  · Continuous positive airway pressure (CPAP) or bi-level positive airway pressure (BPAP) machine. This treatment uses mild air pressure to keep the airways open. A tube that is connected to a motor delivers oxygen to the body.  · Ventilator. This treatment moves air into and out of the lungs by using a tube that is placed in your windpipe.  · Tracheostomy. This is a procedure to create a hole in the neck so that a breathing tube can be inserted.  · Extracorporeal membrane oxygenation (ECMO). This procedure gives the lungs a chance to recover by taking over the functions of the heart and lungs. It supplies oxygen to the body and removes carbon dioxide.  Follow these instructions at home:  Lifestyle  · If you are sick, stay home except to get medical care. Your health care provider will tell you how long to stay home. Call your health care provider before you go for medical care.  · Rest at home as told by your health care provider.  · Do not use any products that contain nicotine or tobacco, such as cigarettes, e-cigarettes, and chewing tobacco. If you need help quitting, ask your health care provider.  · Return to your normal activities as told by your health care provider. Ask your health care provider what activities are safe for you.  General instructions  · Take over-the-counter and prescription medicines only as told by your health care provider.  · Drink enough fluid to keep your urine pale yellow.  · Keep all follow-up visits as told by your health care provider. This is important.  How is this prevented?    There is no vaccine to help prevent COVID-19 infection. However, there are steps you can take to protect yourself and others from this virus.  To protect yourself:   · Do not travel to areas where COVID-19 is a risk. The areas where COVID-19 is reported change often. To identify high-risk areas and travel restrictions, check the CDC travel website: wwwnc.cdc.gov/travel/notices  · If you live in,  or must travel to, an area where COVID-19 is a risk, take precautions to avoid infection.  ? Stay away from people who are sick.  ? Wash your hands often with soap and water for 20 seconds. If soap and water are not available, use an alcohol-based hand .  ? Avoid touching your mouth, face, eyes, or nose.  ? Avoid going out in public, follow guidance from your state and local health authorities.  ? If you must go out in public, wear a cloth face covering or face mask.  ? Disinfect objects and surfaces that are frequently touched every day. This may include:  § Counters and tables.  § Doorknobs and light switches.  § Sinks and faucets.  § Electronics, such as phones, remote controls, keyboards, computers, and tablets.  To protect others:  If you have symptoms of COVID-19, take steps to prevent the virus from spreading to others.  · If you think you have a COVID-19 infection, contact your health care provider right away. Tell your health care team that you think you may have a COVID-19 infection.  · Stay home. Leave your house only to seek medical care. Do not use public transport.  · Do not travel while you are sick.  · Wash your hands often with soap and water for 20 seconds. If soap and water are not available, use alcohol-based hand .  · Stay away from other members of your household. Let healthy household members care for children and pets, if possible. If you have to care for children or pets, wash your hands often and wear a mask. If possible, stay in your own room, separate from others. Use a different bathroom.  · Make sure that all people in your household wash their hands well and often.  · Cough or sneeze into a tissue or your sleeve or elbow. Do not cough or sneeze into your hand or into the air.  · Wear a cloth face covering or face mask.  Where to find more information  · Centers for Disease Control and Prevention: www.cdc.gov/coronavirus/2019-ncov/index.html  · World Health  Organization: www.who.int/health-topics/coronavirus  Contact a health care provider if:  · You live in or have traveled to an area where COVID-19 is a risk and you have symptoms of the infection.  · You have had contact with someone who has COVID-19 and you have symptoms of the infection.  Get help right away if:  · You have trouble breathing.  · You have pain or pressure in your chest.  · You have confusion.  · You have bluish lips and fingernails.  · You have difficulty waking from sleep.  · You have symptoms that get worse.  These symptoms may represent a serious problem that is an emergency. Do not wait to see if the symptoms will go away. Get medical help right away. Call your local emergency services (911 in the [place] Do not drive yourself to the hospital. Let the emergency medical personnel know if you think you have COVID-19.  Summary  · COVID-19 is a respiratory infection that is caused by a virus. It is also known as coronavirus disease or novel coronavirus. It can cause serious infections, such as pneumonia, acute respiratory distress syndrome, acute respiratory failure, or sepsis.  · The virus that causes COVID-19 is contagious. This means that it can spread from person to person through droplets from coughs and sneezes.  · You are more likely to develop a serious illness if you are 65 years of age or older, have a weak immunity, live in a nursing home, or have chronic disease.  · There is no medicine to treat COVID-19. Your health care provider will talk with you about ways to treat your symptoms.  · Take steps to protect yourself and others from infection. Wash your hands often and disinfect objects and surfaces that are frequently touched every day. Stay away from people who are sick and wear a mask if you are sick.  This information is not intended to replace advice given to you by your health care provider. Make sure you discuss any questions you have with your health care provider.  Document  Released: 01/23/2020 Document Revised: 05/14/2020 Document Reviewed: 01/23/2020  [employer] Patient Education © 2020 [employer]

## 2021-08-06 NOTE — PROGRESS NOTES
Subjective:     Rosemary Gonsalez is a 21 y.o. female who presents for Fever (congestion, headache x 2 days )      Started on Tuesday. Frequent hx tonsillitis. Right ear pain, lymph nodes were swollen. ST and ear pain resolved. Cough Tuesday. SOB, asthma. No N/V/D. No hx COVID. 1st covid vaccine 2 weeks ago, HA>     Fever   This is a new problem. Associated symptoms include congestion, coughing, ear pain, headaches, muscle aches, a sore throat and wheezing. Pertinent negatives include no abdominal pain.   Risk factors: no sick contacts        Past Medical History:   Diagnosis Date   • Asthma    • Bronchitis    • Pneumonia        History reviewed. No pertinent surgical history.    Social History     Socioeconomic History   • Marital status: Single     Spouse name: Not on file   • Number of children: Not on file   • Years of education: Not on file   • Highest education level: Not on file   Occupational History   • Not on file   Tobacco Use   • Smoking status: Never Smoker   • Smokeless tobacco: Never Used   Vaping Use   • Vaping Use: Never used   Substance and Sexual Activity   • Alcohol use: No   • Drug use: No   • Sexual activity: Not on file   Other Topics Concern   • Behavioral problems Not Asked   • Interpersonal relationships Not Asked   • Sad or not enjoying activities Not Asked   • Suicidal thoughts Not Asked   • Poor school performance Not Asked   • Reading difficulties Not Asked   • Speech difficulties Not Asked   • Writing difficulties Not Asked   • Inadequate sleep Not Asked   • Excessive TV viewing Not Asked   • Excessive video game use Not Asked   • Inadequate exercise Not Asked   • Sports related Not Asked   • Poor diet Not Asked   • Family concerns for drug/alcohol abuse Not Asked   • Poor oral hygiene Not Asked   • Bike safety Not Asked   • Family concerns vehicle safety Not Asked   Social History Narrative   • Not on file     Social Determinants of Health     Financial Resource Strain:    •  "Difficulty of Paying Living Expenses:    Food Insecurity:    • Worried About Running Out of Food in the Last Year:    • Ran Out of Food in the Last Year:    Transportation Needs:    • Lack of Transportation (Medical):    • Lack of Transportation (Non-Medical):    Physical Activity:    • Days of Exercise per Week:    • Minutes of Exercise per Session:    Stress:    • Feeling of Stress :    Social Connections:    • Frequency of Communication with Friends and Family:    • Frequency of Social Gatherings with Friends and Family:    • Attends Mosque Services:    • Active Member of Clubs or Organizations:    • Attends Club or Organization Meetings:    • Marital Status:    Intimate Partner Violence:    • Fear of Current or Ex-Partner:    • Emotionally Abused:    • Physically Abused:    • Sexually Abused:         No family history on file.     Allergies   Allergen Reactions   • Garlic Cough, Hives, Itching, Shortness of Breath and Swelling   • Other Misc Swelling     Mosquito bites   • Silver Swelling       Review of Systems   Constitutional: Positive for fever and malaise/fatigue.   HENT: Positive for congestion, ear pain and sore throat.    Respiratory: Positive for cough and wheezing.    Gastrointestinal: Negative for abdominal pain.   Neurological: Positive for headaches.   All other systems reviewed and are negative.       Objective:   /68 (BP Location: Left arm, Patient Position: Sitting, BP Cuff Size: Adult)   Pulse (!) 107   Temp 36.8 °C (98.3 °F) (Temporal)   Resp 16   Ht 1.626 m (5' 4\")   Wt 83.6 kg (184 lb 3.2 oz)   SpO2 100%   BMI 31.62 kg/m²     Physical Exam    Assessment/Plan:   There are no diagnoses linked to this encounter.    Differential diagnosis, natural history, supportive care, and indications for immediate follow-up discussed.  "

## 2021-08-12 ASSESSMENT — ENCOUNTER SYMPTOMS
ABDOMINAL PAIN: 0
HEADACHES: 1

## 2021-10-26 ENCOUNTER — OFFICE VISIT (OUTPATIENT)
Dept: URGENT CARE | Facility: CLINIC | Age: 21
End: 2021-10-26
Payer: COMMERCIAL

## 2021-10-26 VITALS
OXYGEN SATURATION: 97 % | DIASTOLIC BLOOD PRESSURE: 84 MMHG | BODY MASS INDEX: 30.73 KG/M2 | HEIGHT: 64 IN | RESPIRATION RATE: 18 BRPM | WEIGHT: 180 LBS | TEMPERATURE: 97.8 F | SYSTOLIC BLOOD PRESSURE: 120 MMHG | HEART RATE: 96 BPM

## 2021-10-26 DIAGNOSIS — J06.9 VIRAL URI WITH COUGH: ICD-10-CM

## 2021-10-26 DIAGNOSIS — U07.1 COVID-19 VIRUS INFECTION: ICD-10-CM

## 2021-10-26 DIAGNOSIS — J02.9 PHARYNGITIS, UNSPECIFIED ETIOLOGY: ICD-10-CM

## 2021-10-26 LAB
EXTERNAL QUALITY CONTROL: NORMAL
INT CON NEG: NEGATIVE
INT CON POS: POSITIVE
S PYO AG THROAT QL: NEGATIVE
SARS-COV+SARS-COV-2 AG RESP QL IA.RAPID: POSITIVE

## 2021-10-26 PROCEDURE — 99213 OFFICE O/P EST LOW 20 MIN: CPT | Mod: CS | Performed by: PHYSICIAN ASSISTANT

## 2021-10-26 PROCEDURE — 87880 STREP A ASSAY W/OPTIC: CPT | Performed by: PHYSICIAN ASSISTANT

## 2021-10-26 PROCEDURE — 87426 SARSCOV CORONAVIRUS AG IA: CPT | Performed by: PHYSICIAN ASSISTANT

## 2021-10-26 NOTE — LETTER
October 26, 2021         Patient: Rosemary Gonsalez   YOB: 2000   Date of Visit: 10/26/2021           To Whom it May Concern:    Rosemary Gonsalez was seen in my clinic on 10/26/2021. She tested POSITIVE for COVID-19 virus in the urgent care today. She may return to work on 11/2/2021 as long as her symptoms are starting to improve and she is no longer having fever, vomiting, or diarrhea.    If you have any questions or concerns, please don't hesitate to call.        Sincerely,           Cecelia Clarke P.A.-C.  Electronically Signed

## 2021-10-31 RX ORDER — NORGESTIMATE AND ETHINYL ESTRADIOL 0.25-0.035
KIT ORAL
COMMUNITY
Start: 2021-10-12 | End: 2022-06-15

## 2021-10-31 ASSESSMENT — ENCOUNTER SYMPTOMS
PALPITATIONS: 0
VOMITING: 0
SORE THROAT: 1
EYE PAIN: 0
SINUS PAIN: 0
COUGH: 1
NAUSEA: 0
CHILLS: 1
HEADACHES: 1
FEVER: 1
SHORTNESS OF BREATH: 0
DIARRHEA: 0
DIZZINESS: 0
BLURRED VISION: 0
MYALGIAS: 1
ABDOMINAL PAIN: 0

## 2021-11-01 NOTE — PROGRESS NOTES
"Subjective     Valentina Gonsalez is a 21 y.o. female who presents with Pharyngitis (x1 week,  cough, body aches, fever, loss of taste)    HPI:  Rosemary Gonsalez is a 21 y.o. female who presents today for evaluation of sore throat and URI symptoms.  Patient reports that the past week she has had sore throat, cough, body aches, fever, and fatigue.  She also notes some increased nasal congestion/runny nose.  Approximately 30 minutes ago she also lost her sense of taste.      Review of Systems   Constitutional: Positive for chills, fever and malaise/fatigue.   HENT: Positive for congestion and sore throat. Negative for ear pain and sinus pain.    Eyes: Negative for blurred vision and pain.   Respiratory: Positive for cough. Negative for shortness of breath.    Cardiovascular: Negative for chest pain and palpitations.   Gastrointestinal: Negative for abdominal pain, diarrhea, nausea and vomiting.   Musculoskeletal: Positive for myalgias.   Skin: Negative for rash.   Neurological: Positive for headaches. Negative for dizziness.         PMH:  has a past medical history of Asthma, Bronchitis, and Pneumonia.  MEDS:   Current Outpatient Medications:   •  hydrOXYzine HCl (ATARAX) 25 MG Tab, Take 25 mg by mouth 3 times a day as needed for Itching., Disp: , Rfl:   •  Albuterol Sulfate (VENTOLIN HFA INH), Inhale  by mouth., Disp: , Rfl:   ALLERGIES:   Allergies   Allergen Reactions   • Garlic Cough, Hives, Itching, Shortness of Breath and Swelling   • Other Misc Swelling     Mosquito bites   • Silver Swelling     SURGHX: History reviewed. No pertinent surgical history.  SOCHX:  reports that she has never smoked. She has never used smokeless tobacco. She reports that she does not drink alcohol and does not use drugs.  FH: Family history was reviewed, no pertinent findings to report        Objective     /84   Pulse 96   Temp 36.6 °C (97.8 °F) (Temporal)   Resp 18   Ht 1.626 m (5' 4\")   Wt 81.6 kg (180 lb)   SpO2 " 97%   BMI 30.90 kg/m²      Physical Exam  Constitutional:       Appearance: She is well-developed.   HENT:      Head: Normocephalic and atraumatic.      Right Ear: Tympanic membrane, ear canal and external ear normal.      Left Ear: Tympanic membrane, ear canal and external ear normal.      Nose: Mucosal edema, congestion and rhinorrhea present. Rhinorrhea is clear.      Mouth/Throat:      Lips: Pink.      Mouth: Mucous membranes are moist.      Pharynx: Oropharynx is clear. Uvula midline. No uvula swelling.   Eyes:      Conjunctiva/sclera: Conjunctivae normal.      Pupils: Pupils are equal, round, and reactive to light.   Cardiovascular:      Rate and Rhythm: Normal rate and regular rhythm.      Heart sounds: Normal heart sounds. No murmur heard.     Pulmonary:      Effort: Pulmonary effort is normal.      Breath sounds: Normal breath sounds. No wheezing.   Musculoskeletal:      Cervical back: Normal range of motion.   Lymphadenopathy:      Cervical: No cervical adenopathy.   Skin:     General: Skin is warm and dry.      Capillary Refill: Capillary refill takes less than 2 seconds.   Neurological:      Mental Status: She is alert and oriented to person, place, and time.   Psychiatric:         Behavior: Behavior normal.         Judgment: Judgment normal.              POCT Rapid Strep A - Negative        POCT SARS-COV Antigen [facility] Manual Result - POSITIVE  Assessment & Plan     1. Pharyngitis, unspecified etiology  - POCT Rapid Strep A  - POCT SARS-COV Antigen [facility] Manual Result    2. Viral URI with cough  - POCT SARS-COV Antigen [facility] Manual Result    3. COVID-19 virus infection  - OTC cold/flu medications  - PO fluids  - Rest  - Tylenol or ibuprofen as needed for fever > 100.4 F  Patient was advised that she will need to self isolate for a total of 10 days, day 1 being the first day that her symptoms started.            Differential Diagnosis, natural history, and supportive care discussed. Return to the Urgent Care  or follow up with your PCP if symptoms fail to resolve, or for any new or worsening symptoms. Emergency room precautions discussed. Patient and/or family appears understanding of information.

## 2021-11-12 ENCOUNTER — OFFICE VISIT (OUTPATIENT)
Dept: URGENT CARE | Facility: CLINIC | Age: 21
End: 2021-11-12
Payer: COMMERCIAL

## 2021-11-12 VITALS
OXYGEN SATURATION: 98 % | WEIGHT: 181 LBS | BODY MASS INDEX: 30.9 KG/M2 | TEMPERATURE: 97.9 F | HEIGHT: 64 IN | HEART RATE: 100 BPM | SYSTOLIC BLOOD PRESSURE: 100 MMHG | DIASTOLIC BLOOD PRESSURE: 60 MMHG | RESPIRATION RATE: 20 BRPM

## 2021-11-12 DIAGNOSIS — R39.9 UTI SYMPTOMS: ICD-10-CM

## 2021-11-12 DIAGNOSIS — Z87.440 HISTORY OF UTI: ICD-10-CM

## 2021-11-12 DIAGNOSIS — N39.0 URINARY TRACT INFECTION WITHOUT HEMATURIA, SITE UNSPECIFIED: ICD-10-CM

## 2021-11-12 PROBLEM — D23.9 DERMATOFIBROMA: Status: ACTIVE | Noted: 2021-11-12

## 2021-11-12 PROBLEM — F41.9 ANXIETY: Status: ACTIVE | Noted: 2021-11-12

## 2021-11-12 PROBLEM — J30.2 SEASONAL ALLERGIES: Status: ACTIVE | Noted: 2021-11-12

## 2021-11-12 PROBLEM — E66.3 OVERWEIGHT: Status: ACTIVE | Noted: 2021-11-12

## 2021-11-12 PROBLEM — F32.9 MAJOR DEPRESSION, SINGLE EPISODE: Status: ACTIVE | Noted: 2021-11-12

## 2021-11-12 PROBLEM — J45.909 ASTHMA WITHOUT STATUS ASTHMATICUS: Status: ACTIVE | Noted: 2018-07-17

## 2021-11-12 PROBLEM — M54.50 LOW BACK PAIN: Status: ACTIVE | Noted: 2018-07-17

## 2021-11-12 LAB
APPEARANCE UR: CLEAR
BILIRUB UR STRIP-MCNC: NEGATIVE MG/DL
COLOR UR AUTO: YELLOW
GLUCOSE UR STRIP.AUTO-MCNC: NEGATIVE MG/DL
INT CON NEG: NEGATIVE
INT CON POS: POSITIVE
KETONES UR STRIP.AUTO-MCNC: NEGATIVE MG/DL
LEUKOCYTE ESTERASE UR QL STRIP.AUTO: NORMAL
NITRITE UR QL STRIP.AUTO: NEGATIVE
PH UR STRIP.AUTO: 7 [PH] (ref 5–8)
POC URINE PREGNANCY TEST: NEGATIVE
PROT UR QL STRIP: NEGATIVE MG/DL
RBC UR QL AUTO: NEGATIVE
SP GR UR STRIP.AUTO: 1.02
UROBILINOGEN UR STRIP-MCNC: NORMAL MG/DL

## 2021-11-12 PROCEDURE — 81002 URINALYSIS NONAUTO W/O SCOPE: CPT | Performed by: NURSE PRACTITIONER

## 2021-11-12 PROCEDURE — 99214 OFFICE O/P EST MOD 30 MIN: CPT | Performed by: NURSE PRACTITIONER

## 2021-11-12 PROCEDURE — 81025 URINE PREGNANCY TEST: CPT | Performed by: NURSE PRACTITIONER

## 2021-11-12 RX ORDER — CEFDINIR 300 MG/1
300 CAPSULE ORAL 2 TIMES DAILY
Qty: 14 CAPSULE | Refills: 0 | Status: SHIPPED | OUTPATIENT
Start: 2021-11-12 | End: 2021-11-19

## 2021-11-12 RX ORDER — LORATADINE 10 MG/1
TABLET ORAL
COMMUNITY

## 2021-11-12 RX ORDER — MELOXICAM 15 MG/1
TABLET ORAL
COMMUNITY
End: 2022-06-15

## 2021-11-12 RX ORDER — TRAZODONE HYDROCHLORIDE 50 MG/1
TABLET ORAL
COMMUNITY

## 2021-11-12 ASSESSMENT — ENCOUNTER SYMPTOMS
FLANK PAIN: 1
FEVER: 0
CONSTITUTIONAL NEGATIVE: 1
BACK PAIN: 1

## 2021-11-12 ASSESSMENT — VISUAL ACUITY: OU: 1

## 2021-11-12 NOTE — LETTER
November 12, 2021         Patient: Rosemary Gonsalez   YOB: 2000   Date of Visit: 11/12/2021           To Whom it May Concern:    Rosemary Gonsalez was seen in my clinic on 11/12/2021 due to illness. Due to medical necessity, please excuse patient from work 11/11/2021 and 11/12/2021.    If you have any questions or concerns, please don't hesitate to call.        Sincerely,         JAKE Chand.  Electronically Signed

## 2021-11-12 NOTE — PATIENT INSTRUCTIONS
Urinary Tract Infection, Adult    A urinary tract infection (UTI) is an infection of any part of the urinary tract. The urinary tract includes the kidneys, ureters, bladder, and urethra. These organs make, store, and get rid of urine in the body.  Your health care provider may use other names to describe the infection. An upper UTI affects the ureters and kidneys (pyelonephritis). A lower UTI affects the bladder (cystitis) and urethra (urethritis).  What are the causes?  Most urinary tract infections are caused by bacteria in your genital area, around the entrance to your urinary tract (urethra). These bacteria grow and cause inflammation of your urinary tract.  What increases the risk?  You are more likely to develop this condition if:  · You have a urinary catheter that stays in place (indwelling).  · You are not able to control when you urinate or have a bowel movement (you have incontinence).  · You are female and you:  ? Use a spermicide or diaphragm for birth control.  ? Have low estrogen levels.  ? Are pregnant.  · You have certain genes that increase your risk (genetics).  · You are sexually active.  · You take antibiotic medicines.  · You have a condition that causes your flow of urine to slow down, such as:  ? An enlarged prostate, if you are male.  ? Blockage in your urethra (stricture).  ? A kidney stone.  ? A nerve condition that affects your bladder control (neurogenic bladder).  ? Not getting enough to drink, or not urinating often.  · You have certain medical conditions, such as:  ? Diabetes.  ? A weak disease-fighting system (immunesystem).  ? Sickle cell disease.  ? Gout.  ? Spinal cord injury.  What are the signs or symptoms?  Symptoms of this condition include:  · Needing to urinate right away (urgently).  · Frequent urination or passing small amounts of urine frequently.  · Pain or burning with urination.  · Blood in the urine.  · Urine that smells bad or unusual.  · Trouble urinating.  · Cloudy  urine.  · Vaginal discharge, if you are female.  · Pain in the abdomen or the lower back.  You may also have:  · Vomiting or a decreased appetite.  · Confusion.  · Irritability or tiredness.  · A fever.  · Diarrhea.  The first symptom in older adults may be confusion. In some cases, they may not have any symptoms until the infection has worsened.  How is this diagnosed?  This condition is diagnosed based on your medical history and a physical exam. You may also have other tests, including:  · Urine tests.  · Blood tests.  · Tests for sexually transmitted infections (STIs).  If you have had more than one UTI, a cystoscopy or imaging studies may be done to determine the cause of the infections.  How is this treated?  Treatment for this condition includes:  · Antibiotic medicine.  · Over-the-counter medicines to treat discomfort.  · Drinking enough water to stay hydrated.  If you have frequent infections or have other conditions such as a kidney stone, you may need to see a health care provider who specializes in the urinary tract (urologist).  In rare cases, urinary tract infections can cause sepsis. Sepsis is a life-threatening condition that occurs when the body responds to an infection. Sepsis is treated in the hospital with IV antibiotics, fluids, and other medicines.  Follow these instructions at home:    Medicines  · Take over-the-counter and prescription medicines only as told by your health care provider.  · If you were prescribed an antibiotic medicine, take it as told by your health care provider. Do not stop using the antibiotic even if you start to feel better.  General instructions  · Make sure you:  ? Empty your bladder often and completely. Do not hold urine for long periods of time.  ? Empty your bladder after sex.  ? Wipe from front to back after a bowel movement if you are female. Use each tissue one time when you wipe.  · Drink enough fluid to keep your urine pale yellow.  · Keep all follow-up  visits as told by your health care provider. This is important.  Contact a health care provider if:  · Your symptoms do not get better after 1-2 days.  · Your symptoms go away and then return.  Get help right away if you have:  · Severe pain in your back or your lower abdomen.  · A fever.  · Nausea or vomiting.  Summary  · A urinary tract infection (UTI) is an infection of any part of the urinary tract, which includes the kidneys, ureters, bladder, and urethra.  · Most urinary tract infections are caused by bacteria in your genital area, around the entrance to your urinary tract (urethra).  · Treatment for this condition often includes antibiotic medicines.  · If you were prescribed an antibiotic medicine, take it as told by your health care provider. Do not stop using the antibiotic even if you start to feel better.  · Keep all follow-up visits as told by your health care provider. This is important.  This information is not intended to replace advice given to you by your health care provider. Make sure you discuss any questions you have with your health care provider.  Document Released: 09/27/2006 Document Revised: 12/05/2019 Document Reviewed: 06/27/2019  [employer] © 2020 [employer]

## 2021-11-12 NOTE — PROGRESS NOTES
Subjective:     Rosemary Gonsalez is a 21 y.o. female who presents for UTI (uti sxs x 11/05/2021)       UTI  This is a new problem. The problem has been gradually worsening. Pertinent negatives include no fever.      Patient reports history of UTI with similar symptoms.    Reports she used telemedicine.  Was started on a course of nitrofurantoin which she finished.  Still experiencing symptoms.  Reports right flank pain going to her back.  Also has some burning with urination.  Took Pyridium.    Patient was screened prior to rooming and denied COVID-19 diagnosis or contact with a person who has been diagnosed or is suspected to have COVID-19. During this visit, appropriate PPE was worn, hand hygiene was performed, and the patient and any visitors were masked.     PMH:  has a past medical history of Asthma, Bronchitis, and Pneumonia.    MEDS:   Current Outpatient Medications:   •  cefdinir (OMNICEF) 300 MG Cap, Take 1 Capsule by mouth 2 times a day for 7 days., Disp: 14 Capsule, Rfl: 0  •  ESTARYLLA 0.25-35 MG-MCG per tablet, , Disp: , Rfl:   •  hydrOXYzine HCl (ATARAX) 25 MG Tab, Take 25 mg by mouth 3 times a day as needed for Itching., Disp: , Rfl:   •  Albuterol Sulfate (VENTOLIN HFA INH), Inhale  by mouth., Disp: , Rfl:     ALLERGIES:   Allergies   Allergen Reactions   • Garlic Cough, Hives, Itching, Shortness of Breath and Swelling   • Other Misc Swelling     Mosquito bites   • Silver Swelling       SURGHX: History reviewed. No pertinent surgical history.    SOCHX:  reports that she has never smoked. She has never used smokeless tobacco. She reports that she does not drink alcohol and does not use drugs.     FH: Reviewed with patient, not pertinent to this visit.    Review of Systems   Constitutional: Negative.  Negative for fever and malaise/fatigue.   Genitourinary: Positive for dysuria and flank pain.   Musculoskeletal: Positive for back pain.   All other systems reviewed and are negative.    Additional  "details per HPI.      Objective:     /60 (BP Location: Left arm, Patient Position: Sitting, BP Cuff Size: Large adult long)   Pulse 100   Temp 36.6 °C (97.9 °F) (Temporal)   Resp 20   Ht 1.626 m (5' 4\")   Wt 82.1 kg (181 lb)   SpO2 98%   BMI 31.07 kg/m²     Physical Exam  Vitals reviewed.   Constitutional:       General: She is not in acute distress.     Appearance: She is well-developed. She is not ill-appearing or toxic-appearing.   HENT:      Right Ear: External ear normal.      Left Ear: External ear normal.   Eyes:      General: Vision grossly intact.      Extraocular Movements: Extraocular movements intact.      Conjunctiva/sclera: Conjunctivae normal.   Cardiovascular:      Rate and Rhythm: Normal rate.   Pulmonary:      Effort: Pulmonary effort is normal. No respiratory distress.   Abdominal:      Palpations: Abdomen is soft.      Tenderness: There is abdominal tenderness (Mild suprapubic). There is right CVA tenderness (Minimal).   Musculoskeletal:         General: No deformity. Normal range of motion.      Cervical back: Normal range of motion.   Skin:     General: Skin is warm and dry.      Coloration: Skin is not pale.   Neurological:      Mental Status: She is alert and oriented to person, place, and time.      Sensory: No sensory deficit.      Motor: No weakness.   Psychiatric:         Behavior: Behavior normal. Behavior is cooperative.     UA: small LE    POCT pregnancy: negative      Assessment/Plan:     1. UTI symptoms  - POCT Urinalysis  - POCT PREGNANCY    2. History of UTI    3. Urinary tract infection without hematuria, site unspecified  - cefdinir (OMNICEF) 300 MG Cap; Take 1 Capsule by mouth 2 times a day for 7 days.  Dispense: 14 Capsule; Refill: 0    Rx as above sent electronically. Increase fluids.    Differential diagnosis, natural history, supportive care, over-the-counter symptom management per 's instructions, close monitoring, and indications for immediate " follow-up discussed.     All questions answered. Patient agrees with the plan of care.    Discharge summary provided.     Work note provided.     Billing note: 30 minutes was allotted and spent for patient care and coordination of care (not reported separately) including preparing for the visit, obtaining/reviewing history, performing an exam/evaluation, ordering Rx, developing a plan of care, counseling/educating the patient, developing/printing/going over the discharge summary with the patient, producing a work note, updating the medical record, reconciling outside information, and documentation. Care specific to this encounter was summarized here. Please refer to the chart for additional details on the care provided.

## 2022-06-15 ENCOUNTER — OFFICE VISIT (OUTPATIENT)
Dept: URGENT CARE | Facility: CLINIC | Age: 22
End: 2022-06-15
Payer: COMMERCIAL

## 2022-06-15 VITALS
HEIGHT: 64 IN | DIASTOLIC BLOOD PRESSURE: 80 MMHG | HEART RATE: 108 BPM | OXYGEN SATURATION: 97 % | BODY MASS INDEX: 27.31 KG/M2 | WEIGHT: 160 LBS | SYSTOLIC BLOOD PRESSURE: 120 MMHG | RESPIRATION RATE: 16 BRPM | TEMPERATURE: 100.2 F

## 2022-06-15 DIAGNOSIS — R50.9 FEVER, UNSPECIFIED FEVER CAUSE: ICD-10-CM

## 2022-06-15 DIAGNOSIS — U07.1 COVID: ICD-10-CM

## 2022-06-15 LAB
EXTERNAL QUALITY CONTROL: ABNORMAL
FLUAV+FLUBV AG SPEC QL IA: NEGATIVE
INT CON NEG: NEGATIVE
INT CON NEG: NEGATIVE
INT CON POS: POSITIVE
INT CON POS: POSITIVE
S PYO AG THROAT QL: NEGATIVE
SARS-COV+SARS-COV-2 AG RESP QL IA.RAPID: POSITIVE

## 2022-06-15 PROCEDURE — 99214 OFFICE O/P EST MOD 30 MIN: CPT | Mod: CS | Performed by: NURSE PRACTITIONER

## 2022-06-15 PROCEDURE — 87880 STREP A ASSAY W/OPTIC: CPT | Performed by: NURSE PRACTITIONER

## 2022-06-15 PROCEDURE — 87804 INFLUENZA ASSAY W/OPTIC: CPT | Performed by: NURSE PRACTITIONER

## 2022-06-15 PROCEDURE — 87426 SARSCOV CORONAVIRUS AG IA: CPT | Performed by: NURSE PRACTITIONER

## 2022-06-15 NOTE — LETTER
Anahi 15, 2022         Patient: Rosemary Gonsalez   YOB: 2000   Date of Visit: 6/15/2022           To Whom it May Concern:    Rosemary Gonsalez was seen in my clinic on 6/15/2022. She may return to work on 6/20/22.    If you have any questions or concerns, please don't hesitate to call.        Sincerely,           JAKE Browning.  Electronically Signed

## 2022-06-16 ASSESSMENT — ENCOUNTER SYMPTOMS
NAUSEA: 0
CHILLS: 1
COUGH: 1
RHINORRHEA: 1
VOMITING: 0
MYALGIAS: 0
EYE PAIN: 0
WHEEZING: 1
SHORTNESS OF BREATH: 1
HEADACHES: 1
SORE THROAT: 1
DIZZINESS: 0

## 2022-06-16 NOTE — PROGRESS NOTES
"Subjective:   Roesmary Gonsalez is a 22 y.o. female who presents for Asthma (Fever, cough, sore throat  x today )      URI   This is a new problem. The current episode started today. The problem has been gradually worsening. The maximum temperature recorded prior to her arrival was 100.4 - 100.9 F. The fever has been present for less than 1 day. Associated symptoms include congestion, coughing, headaches, rhinorrhea, a sore throat and wheezing. Pertinent negatives include no chest pain, ear pain, nausea, plugged ear sensation, rash or vomiting. She has tried acetaminophen for the symptoms. The treatment provided no relief.       Review of Systems   Constitutional: Positive for chills and malaise/fatigue.   HENT: Positive for congestion, rhinorrhea and sore throat. Negative for ear pain.    Eyes: Negative for pain.   Respiratory: Positive for cough, shortness of breath and wheezing.    Cardiovascular: Negative for chest pain.   Gastrointestinal: Negative for nausea and vomiting.   Genitourinary: Negative for hematuria.   Musculoskeletal: Negative for myalgias.   Skin: Negative for rash.   Neurological: Positive for headaches. Negative for dizziness.       Medications:    • loratadine Tabs  • MOTRIN PO  • Nirmatrelvir & Ritonavir Tbpk  • traZODone Tabs  • VENTOLIN HFA INH    Allergies: Garlic, Other misc, and Silver    Problem List: Rosemary Gonsalez does not have any pertinent problems on file.    Surgical History:  No past surgical history on file.    Past Social Hx: Rosemary Gonsalez  reports that she has never smoked. She has never used smokeless tobacco. She reports that she does not drink alcohol and does not use drugs.     Past Family Hx:  Rosemary Gonsalez family history is not on file.     Problem list, medications, and allergies reviewed by myself today in Epic.     Objective:     /80   Pulse (!) 108   Temp 37.9 °C (100.2 °F)   Resp 16   Ht 1.626 m (5' 4\")   Wt 72.6 kg (160 lb)   SpO2 " 97%   BMI 27.46 kg/m²     Physical Exam  Vitals and nursing note reviewed.   Constitutional:       General: She is not in acute distress.     Appearance: She is well-developed.   HENT:      Head: Normocephalic and atraumatic.      Right Ear: Tympanic membrane and external ear normal.      Left Ear: Tympanic membrane and external ear normal.      Nose: Nose normal.      Right Sinus: No maxillary sinus tenderness or frontal sinus tenderness.      Left Sinus: No maxillary sinus tenderness or frontal sinus tenderness.      Mouth/Throat:      Mouth: Mucous membranes are moist.      Pharynx: Uvula midline. No posterior oropharyngeal erythema.      Tonsils: No tonsillar exudate or tonsillar abscesses.   Eyes:      General:         Right eye: No discharge.         Left eye: No discharge.      Conjunctiva/sclera: Conjunctivae normal.   Cardiovascular:      Rate and Rhythm: Normal rate.   Pulmonary:      Effort: Pulmonary effort is normal. No respiratory distress.      Breath sounds: Normal breath sounds.   Abdominal:      General: There is no distension.   Musculoskeletal:         General: Normal range of motion.   Skin:     General: Skin is warm and dry.   Neurological:      General: No focal deficit present.      Mental Status: She is alert and oriented to person, place, and time. Mental status is at baseline.      Gait: Gait (gait at baseline) normal.   Psychiatric:         Judgment: Judgment normal.         Assessment/Plan:     Diagnosis and associated orders:     1. Fever, unspecified fever cause  POCT Rapid Strep A    POCT Influenza A/B    POCT SARS-COV Antigen [facility] (Symptomatic only)    Nirmatrelvir & Ritonavir 20 x 150 MG & 10 x 100MG Tablet Therapy Pack   2. COVID  Nirmatrelvir & Ritonavir 20 x 150 MG & 10 x 100MG Tablet Therapy Pack      Comments/MDM:     • COVID-positive  •   • Patient with a history of asthma, does feel short of breath, oxygen adequate, discussed risk versus benefits of treatment with Paxlovid.   Patient verbalizing understanding would like to trial medication.Advised to continue supportive care with Tylenol and/or ibuprofen for fevers and discomfort. Increased fluids and electrolytes. Differential diagnosis, natural history, supportive care, and indications for immediate follow-up discussed.              Please note that this dictation was created using voice recognition software. I have made a reasonable attempt to correct obvious errors, but I expect that there are errors of grammar and possibly content that I did not discover before finalizing the note.    This note was electronically signed by Martin BAE.

## 2022-08-15 ENCOUNTER — OFFICE VISIT (OUTPATIENT)
Dept: URGENT CARE | Facility: CLINIC | Age: 22
End: 2022-08-15
Payer: COMMERCIAL

## 2022-08-15 VITALS
HEART RATE: 68 BPM | OXYGEN SATURATION: 98 % | SYSTOLIC BLOOD PRESSURE: 112 MMHG | TEMPERATURE: 97.9 F | DIASTOLIC BLOOD PRESSURE: 60 MMHG | WEIGHT: 148 LBS | BODY MASS INDEX: 25.27 KG/M2 | RESPIRATION RATE: 16 BRPM | HEIGHT: 64 IN

## 2022-08-15 DIAGNOSIS — N30.90 CYSTITIS: ICD-10-CM

## 2022-08-15 DIAGNOSIS — R30.0 DYSURIA: ICD-10-CM

## 2022-08-15 LAB
APPEARANCE UR: CLEAR
BILIRUB UR STRIP-MCNC: NEGATIVE MG/DL
COLOR UR AUTO: YELLOW
GLUCOSE UR STRIP.AUTO-MCNC: NEGATIVE MG/DL
KETONES UR STRIP.AUTO-MCNC: NEGATIVE MG/DL
LEUKOCYTE ESTERASE UR QL STRIP.AUTO: ABNORMAL
NITRITE UR QL STRIP.AUTO: NEGATIVE
PH UR STRIP.AUTO: 7.5 [PH] (ref 5–8)
PROT UR QL STRIP: NEGATIVE MG/DL
RBC UR QL AUTO: ABNORMAL
SP GR UR STRIP.AUTO: 1.01
UROBILINOGEN UR STRIP-MCNC: 0.2 MG/DL

## 2022-08-15 PROCEDURE — 99213 OFFICE O/P EST LOW 20 MIN: CPT

## 2022-08-15 PROCEDURE — 81002 URINALYSIS NONAUTO W/O SCOPE: CPT

## 2022-08-15 RX ORDER — IBUPROFEN 200 MG
800 TABLET ORAL ONCE
Status: COMPLETED | OUTPATIENT
Start: 2022-08-15 | End: 2022-08-15

## 2022-08-15 RX ORDER — PHENAZOPYRIDINE HYDROCHLORIDE 200 MG/1
200 TABLET, FILM COATED ORAL 3 TIMES DAILY PRN
Qty: 6 TABLET | Refills: 0 | Status: SHIPPED | OUTPATIENT
Start: 2022-08-15

## 2022-08-15 RX ORDER — SULFAMETHOXAZOLE AND TRIMETHOPRIM 800; 160 MG/1; MG/1
1 TABLET ORAL 2 TIMES DAILY
Qty: 10 TABLET | Refills: 0 | Status: SHIPPED | OUTPATIENT
Start: 2022-08-15 | End: 2022-08-20

## 2022-08-15 RX ORDER — IBUPROFEN 600 MG/1
600 TABLET ORAL EVERY 6 HOURS PRN
Qty: 30 TABLET | Refills: 0 | Status: SHIPPED | OUTPATIENT
Start: 2022-08-15 | End: 2022-08-20

## 2022-08-15 RX ADMIN — Medication 800 MG: at 16:34

## 2022-08-15 NOTE — PROGRESS NOTES
"obuSubjective:   Rosemary Gonsalez is a 22 y.o. female who presents for UTI (X 2 hours)      HPI:  Patient presents to urgent care with complaints of urinary frequency, hesitancy, hematuria, dysuria.  Patient states symptoms started proximately 2 hours ago.  Patient endorses sexual activity.  Patient denies vaginal discharge.  Patient denies fever, chills, night sweats, nausea, vomiting, diarrhea, flank/back pain.     ROS as above per HPI    Medications:    Current Outpatient Medications on File Prior to Visit   Medication Sig Dispense Refill    Ibuprofen (MOTRIN PO) Take  by mouth.      loratadine (CLARITIN) 10 MG Tab 1 tablet      Albuterol Sulfate (VENTOLIN HFA INH) Inhale  by mouth.      Nirmatrelvir & Ritonavir 20 x 150 MG & 10 x 100MG Tablet Therapy Pack Take 300 mg nirmatrelvir (two 150 mg tablets) with 100 mg  ritonavir (one 100 mg tablet) by mouth, with all three tablets taken together  twice daily for 5 days. (Patient not taking: Reported on 8/15/2022) 30 Each 0    traZODone (DESYREL) 50 MG Tab 1 tablet (Patient not taking: No sig reported)       No current facility-administered medications on file prior to visit.        Allergies:   Garlic, Other misc, and Silver    Problem List:   Patient Active Problem List   Diagnosis    Overweight    Major depression, single episode    Seasonal allergies    Low back pain    Dermatofibroma    Asthma without status asthmaticus    Anxiety        Surgical History:  No past surgical history on file.    Past Social Hx:   Social History     Tobacco Use    Smoking status: Never    Smokeless tobacco: Never   Vaping Use    Vaping Use: Never used   Substance Use Topics    Alcohol use: No    Drug use: No          Problem list, medications, and allergies reviewed by myself today in Epic.     Objective:     /60 (BP Location: Left arm, Patient Position: Sitting, BP Cuff Size: Adult long)   Pulse 68   Temp 36.6 °C (97.9 °F) (Temporal)   Resp 16   Ht 1.626 m (5' 4\")   Wt " 67.1 kg (148 lb)   SpO2 98%   BMI 25.40 kg/m²     Physical Exam  Vitals and nursing note reviewed.   Constitutional:       Appearance: Normal appearance.   HENT:      Head: Normocephalic and atraumatic.   Eyes:      Conjunctiva/sclera: Conjunctivae normal.      Pupils: Pupils are equal, round, and reactive to light.   Cardiovascular:      Rate and Rhythm: Normal rate and regular rhythm.      Heart sounds: Normal heart sounds.   Pulmonary:      Effort: Pulmonary effort is normal.      Breath sounds: Normal breath sounds.   Abdominal:      General: Bowel sounds are normal.      Palpations: Abdomen is soft.   Genitourinary:     Vagina: No vaginal discharge.   Neurological:      Mental Status: She is alert and oriented to person, place, and time.       Results for orders placed or performed in visit on 08/15/22   POCT Urinalysis   Result Value Ref Range    POC Color Yellow Negative    POC Appearance Clear Negative    POC Leukocyte Esterase Small Negative    POC Nitrites Negative Negative    POC Urobiligen 0.2 Negative (0.2) mg/dL    POC Protein Negative Negative mg/dL    POC Urine PH 7.5 5.0 - 8.0    POC Blood Moderate Negative    POC Specific Gravity 1.015 <1.005 - >1.030    POC Ketones Negative Negative mg/dL    POC Bilirubin Negative Negative mg/dL    POC Glucose Negative Negative mg/dL         Assessment/Plan:     Diagnosis and associated orders:   1. Cystitis  - sulfamethoxazole-trimethoprim (BACTRIM DS) 800-160 MG tablet; Take 1 Tablet by mouth 2 times a day for 5 days.  Dispense: 10 Tablet; Refill: 0  - phenazopyridine (PYRIDIUM) 200 MG Tab; Take 1 Tablet by mouth 3 times a day as needed for Moderate Pain.  Dispense: 6 Tablet; Refill: 0  - POCT Urinalysis    2. Dysuria  - ibuprofen (MOTRIN) tablet 800 mg  - ibuprofen (MOTRIN) 600 MG Tab; Take 1 Tablet by mouth every 6 hours as needed for Moderate Pain for up to 5 days.  Dispense: 30 Tablet; Refill: 0      Comments/MDM:            Pt is clinically stable at  today's acute urgent care visit.  No acute distress noted. Appropriate for outpatient management at this time.       Discussed DDx, management options (risks,benefits, and alternatives to planned treatment), natural progression and supportive care.  Expressed understanding and the treatment plan was agreed upon. Questions were encouraged and answered   Return to urgent care prn if new or worsening sx or if there is no improvement in condition prn.    Educated in Red flags and indications to immediately call 911 or present to the Emergency Department.   Advised the patient to follow-up with the primary care physician for recheck, reevaluation, and consideration of further management.      Please note that this dictation was created using voice recognition software. I have made a reasonable attempt to correct obvious errors, but I expect that there are errors of grammar and possibly content that I did not discover before finalizing the note.    This note was electronically signed by Shiela Tony DNP

## 2024-07-20 ENCOUNTER — APPOINTMENT (OUTPATIENT)
Dept: RADIOLOGY | Facility: MEDICAL CENTER | Age: 24
End: 2024-07-20
Attending: STUDENT IN AN ORGANIZED HEALTH CARE EDUCATION/TRAINING PROGRAM

## 2024-07-20 ENCOUNTER — HOSPITAL ENCOUNTER (EMERGENCY)
Facility: MEDICAL CENTER | Age: 24
End: 2024-07-20
Attending: STUDENT IN AN ORGANIZED HEALTH CARE EDUCATION/TRAINING PROGRAM

## 2024-07-20 VITALS
RESPIRATION RATE: 17 BRPM | TEMPERATURE: 98.2 F | OXYGEN SATURATION: 93 % | WEIGHT: 180 LBS | HEART RATE: 79 BPM | SYSTOLIC BLOOD PRESSURE: 107 MMHG | DIASTOLIC BLOOD PRESSURE: 61 MMHG | BODY MASS INDEX: 30.73 KG/M2 | HEIGHT: 64 IN

## 2024-07-20 DIAGNOSIS — F10.920 ALCOHOLIC INTOXICATION WITHOUT COMPLICATION (HCC): ICD-10-CM

## 2024-07-20 LAB
ALBUMIN SERPL BCP-MCNC: 4.1 G/DL (ref 3.2–4.9)
ALBUMIN/GLOB SERPL: 1.5 G/DL
ALP SERPL-CCNC: 68 U/L (ref 30–99)
ALT SERPL-CCNC: 15 U/L (ref 2–50)
ANION GAP SERPL CALC-SCNC: 13 MMOL/L (ref 7–16)
AST SERPL-CCNC: 16 U/L (ref 12–45)
BASOPHILS # BLD AUTO: 0.6 % (ref 0–1.8)
BASOPHILS # BLD: 0.04 K/UL (ref 0–0.12)
BILIRUB SERPL-MCNC: <0.2 MG/DL (ref 0.1–1.5)
BUN SERPL-MCNC: 8 MG/DL (ref 8–22)
CALCIUM ALBUM COR SERPL-MCNC: 8.7 MG/DL (ref 8.5–10.5)
CALCIUM SERPL-MCNC: 8.8 MG/DL (ref 8.5–10.5)
CHLORIDE SERPL-SCNC: 106 MMOL/L (ref 96–112)
CO2 SERPL-SCNC: 20 MMOL/L (ref 20–33)
CREAT SERPL-MCNC: 0.58 MG/DL (ref 0.5–1.4)
EOSINOPHIL # BLD AUTO: 0.06 K/UL (ref 0–0.51)
EOSINOPHIL NFR BLD: 0.9 % (ref 0–6.9)
ERYTHROCYTE [DISTWIDTH] IN BLOOD BY AUTOMATED COUNT: 40 FL (ref 35.9–50)
GFR SERPLBLD CREATININE-BSD FMLA CKD-EPI: 129 ML/MIN/1.73 M 2
GLOBULIN SER CALC-MCNC: 2.8 G/DL (ref 1.9–3.5)
GLUCOSE SERPL-MCNC: 142 MG/DL (ref 65–99)
HCG SERPL QL: NEGATIVE
HCT VFR BLD AUTO: 37.2 % (ref 37–47)
HGB BLD-MCNC: 12.5 G/DL (ref 12–16)
IMM GRANULOCYTES # BLD AUTO: 0.07 K/UL (ref 0–0.11)
IMM GRANULOCYTES NFR BLD AUTO: 1.1 % (ref 0–0.9)
LYMPHOCYTES # BLD AUTO: 2.21 K/UL (ref 1–4.8)
LYMPHOCYTES NFR BLD: 34.1 % (ref 22–41)
MAGNESIUM SERPL-MCNC: 1.9 MG/DL (ref 1.5–2.5)
MCH RBC QN AUTO: 30.5 PG (ref 27–33)
MCHC RBC AUTO-ENTMCNC: 33.6 G/DL (ref 32.2–35.5)
MCV RBC AUTO: 90.7 FL (ref 81.4–97.8)
MONOCYTES # BLD AUTO: 0.47 K/UL (ref 0–0.85)
MONOCYTES NFR BLD AUTO: 7.3 % (ref 0–13.4)
NEUTROPHILS # BLD AUTO: 3.63 K/UL (ref 1.82–7.42)
NEUTROPHILS NFR BLD: 56 % (ref 44–72)
NRBC # BLD AUTO: 0 K/UL
NRBC BLD-RTO: 0 /100 WBC (ref 0–0.2)
PLATELET # BLD AUTO: 333 K/UL (ref 164–446)
PMV BLD AUTO: 8.4 FL (ref 9–12.9)
POTASSIUM SERPL-SCNC: 3.5 MMOL/L (ref 3.6–5.5)
PROT SERPL-MCNC: 6.9 G/DL (ref 6–8.2)
RBC # BLD AUTO: 4.1 M/UL (ref 4.2–5.4)
SODIUM SERPL-SCNC: 139 MMOL/L (ref 135–145)
WBC # BLD AUTO: 6.5 K/UL (ref 4.8–10.8)

## 2024-07-20 PROCEDURE — 36415 COLL VENOUS BLD VENIPUNCTURE: CPT

## 2024-07-20 PROCEDURE — 96366 THER/PROPH/DIAG IV INF ADDON: CPT

## 2024-07-20 PROCEDURE — 85025 COMPLETE CBC W/AUTO DIFF WBC: CPT

## 2024-07-20 PROCEDURE — 99285 EMERGENCY DEPT VISIT HI MDM: CPT

## 2024-07-20 PROCEDURE — 700101 HCHG RX REV CODE 250: Performed by: STUDENT IN AN ORGANIZED HEALTH CARE EDUCATION/TRAINING PROGRAM

## 2024-07-20 PROCEDURE — 70450 CT HEAD/BRAIN W/O DYE: CPT

## 2024-07-20 PROCEDURE — 83735 ASSAY OF MAGNESIUM: CPT

## 2024-07-20 PROCEDURE — 80053 COMPREHEN METABOLIC PANEL: CPT

## 2024-07-20 PROCEDURE — 96365 THER/PROPH/DIAG IV INF INIT: CPT

## 2024-07-20 PROCEDURE — 84703 CHORIONIC GONADOTROPIN ASSAY: CPT

## 2024-07-20 RX ADMIN — THIAMINE HYDROCHLORIDE: 100 INJECTION, SOLUTION INTRAMUSCULAR; INTRAVENOUS at 04:38

## 2025-01-20 ENCOUNTER — RESEARCH ENCOUNTER (OUTPATIENT)
Dept: RESEARCH | Facility: MEDICAL CENTER | Age: 25
End: 2025-01-20